# Patient Record
Sex: FEMALE | URBAN - METROPOLITAN AREA
[De-identification: names, ages, dates, MRNs, and addresses within clinical notes are randomized per-mention and may not be internally consistent; named-entity substitution may affect disease eponyms.]

---

## 2022-02-24 ENCOUNTER — APPOINTMENT (RX ONLY)
Dept: URBAN - METROPOLITAN AREA CLINIC 167 | Facility: CLINIC | Age: 67
Setting detail: DERMATOLOGY
End: 2022-02-24

## 2022-02-24 ENCOUNTER — APPOINTMENT (OUTPATIENT)
Age: 67
Setting detail: DERMATOLOGY
End: 2022-02-24

## 2022-02-24 ENCOUNTER — APPOINTMENT (RX ONLY)
Dept: URBAN - METROPOLITAN AREA CLINIC 75 | Facility: CLINIC | Age: 67
Setting detail: DERMATOLOGY
End: 2022-02-24

## 2022-02-24 DIAGNOSIS — L30.9 DERMATITIS, UNSPECIFIED: ICD-10-CM

## 2022-02-24 PROCEDURE — ? CHRONOLOGY OF PRESENT ILLNESS

## 2022-02-24 PROCEDURE — 99212 OFFICE O/P EST SF 10 MIN: CPT

## 2022-02-24 PROCEDURE — ? PRESCRIPTION

## 2022-02-24 PROCEDURE — ? COUNSELING

## 2022-02-24 RX ORDER — KETOCONAZOLE 20 MG/G
1 CREAM TOPICAL BID
Qty: 60 | Refills: 0 | Status: ERX | COMMUNITY
Start: 2022-02-24

## 2022-02-24 RX ORDER — KETOCONAZOLE 20 MG/G
1 CREAM TOPICAL BID
Qty: 60 | Refills: 0 | Status: ERX | COMMUNITY
Start: 2022-02-24 | End: 2022-02-26

## 2022-02-24 RX ADMIN — KETOCONAZOLE 1: 20 CREAM TOPICAL at 00:00

## 2022-02-24 ASSESSMENT — LOCATION DETAILED DESCRIPTION DERM
LOCATION DETAILED: RIGHT SUPERIOR ANTERIOR NECK

## 2022-02-24 ASSESSMENT — LOCATION SIMPLE DESCRIPTION DERM
LOCATION SIMPLE: RIGHT ANTERIOR NECK

## 2022-02-24 ASSESSMENT — LOCATION ZONE DERM
LOCATION ZONE: NECK

## 2022-02-24 NOTE — PROCEDURE: COUNSELING
Moisturizer Recommendations: Recommended diligent emollient use with gentle, unscented moisturizers.
Detail Level: Zone

## 2022-02-24 NOTE — PROCEDURE: MIPS QUALITY
Quality 226: Preventive Care And Screening: Tobacco Use: Screening And Cessation Intervention: Patient screened for tobacco use and is an ex/non-smoker
Detail Level: Detailed
Quality 431: Preventive Care And Screening: Unhealthy Alcohol Use - Screening: Patient not identified as an unhealthy alcohol user when screened for unhealthy alcohol use using a systematic screening method
Quality 110: Preventive Care And Screening: Influenza Immunization: Influenza Immunization not Administered because Patient Refused.
Quality 111:Pneumonia Vaccination Status For Older Adults: Pneumococcal Vaccination Previously Received

## 2022-03-09 ENCOUNTER — APPOINTMENT (RX ONLY)
Dept: URBAN - METROPOLITAN AREA CLINIC 167 | Facility: CLINIC | Age: 67
Setting detail: DERMATOLOGY
End: 2022-03-09

## 2022-03-09 ENCOUNTER — APPOINTMENT (RX ONLY)
Dept: URBAN - METROPOLITAN AREA CLINIC 75 | Facility: CLINIC | Age: 67
Setting detail: DERMATOLOGY
End: 2022-03-09

## 2022-03-09 ENCOUNTER — APPOINTMENT (OUTPATIENT)
Age: 67
Setting detail: DERMATOLOGY
End: 2022-03-09

## 2022-03-09 DIAGNOSIS — L30.9 DERMATITIS, UNSPECIFIED: ICD-10-CM

## 2022-03-09 DIAGNOSIS — L30.9 DERMATITIS, UNSPECIFIED: ICD-10-CM | Status: RESOLVING

## 2022-03-09 PROCEDURE — 99213 OFFICE O/P EST LOW 20 MIN: CPT

## 2022-03-09 PROCEDURE — ? CHRONOLOGY OF PRESENT ILLNESS

## 2022-03-09 PROCEDURE — ? COUNSELING

## 2022-03-09 ASSESSMENT — LOCATION ZONE DERM
LOCATION ZONE: NECK

## 2022-03-09 ASSESSMENT — LOCATION DETAILED DESCRIPTION DERM
LOCATION DETAILED: RIGHT SUPERIOR ANTERIOR NECK

## 2022-03-09 ASSESSMENT — LOCATION SIMPLE DESCRIPTION DERM
LOCATION SIMPLE: RIGHT ANTERIOR NECK

## 2022-03-09 NOTE — PROCEDURE: MIPS QUALITY
Quality 110: Preventive Care And Screening: Influenza Immunization: Influenza Immunization not Administered because Patient Refused.
Quality 111:Pneumonia Vaccination Status For Older Adults: Pneumococcal Vaccination Previously Received
Quality 431: Preventive Care And Screening: Unhealthy Alcohol Use - Screening: Patient not identified as an unhealthy alcohol user when screened for unhealthy alcohol use using a systematic screening method
Quality 226: Preventive Care And Screening: Tobacco Use: Screening And Cessation Intervention: Patient screened for tobacco use and is an ex/non-smoker
Detail Level: Detailed

## 2022-03-09 NOTE — PROCEDURE: CHRONOLOGY OF PRESENT ILLNESS
Detail Level: Zone
Chronology Of Present Illness: 2/24/22\\nInitial presentation to office for pruritic skin eruption to neck since December 2021 and she has been treating with Clobetasol twice daily for 20 days. we discussed possible tinea due to clinical presentation and no improvement with topical steroid. Discussed biopsy, but patient declined today. Will rx topical and revisit in 2 weeks. Will proceed with biopsy at that time\\n\\nPotential triggers: possibly tinea: new stray cats\\nPrevious treatment: Clobetasol \\n\\nNo new medications, vitamins, or supplements in the last 6 months. \\nNo fever, arthralgias, photosensitivity or other systemic symptoms. Otherwise healthy and well. \\nNo household contacts with similar symptoms. \\n\\n3/9/22\\nNeck clear upon examination. Advised patient to continue ketoconazole cream treatment 1 additional week then d/c. If rash reflares patient advised to contact office.

## 2022-03-09 NOTE — PROCEDURE: COUNSELING
Detail Level: Zone
Moisturizer Recommendations: Recommended diligent emollient use with gentle, unscented moisturizers.

## 2022-06-15 ENCOUNTER — APPOINTMENT (RX ONLY)
Dept: URBAN - METROPOLITAN AREA CLINIC 167 | Facility: CLINIC | Age: 67
Setting detail: DERMATOLOGY
End: 2022-06-15

## 2022-06-15 ENCOUNTER — APPOINTMENT (OUTPATIENT)
Age: 67
Setting detail: DERMATOLOGY
End: 2022-06-15

## 2022-06-15 ENCOUNTER — APPOINTMENT (RX ONLY)
Dept: URBAN - METROPOLITAN AREA CLINIC 75 | Facility: CLINIC | Age: 67
Setting detail: DERMATOLOGY
End: 2022-06-15

## 2022-06-15 DIAGNOSIS — L30.9 DERMATITIS, UNSPECIFIED: ICD-10-CM | Status: RESOLVED

## 2022-06-15 DIAGNOSIS — L30.9 DERMATITIS, UNSPECIFIED: ICD-10-CM

## 2022-06-15 PROCEDURE — 99213 OFFICE O/P EST LOW 20 MIN: CPT

## 2022-06-15 PROCEDURE — ? COUNSELING

## 2022-06-15 PROCEDURE — ? CHRONOLOGY OF PRESENT ILLNESS

## 2022-06-15 ASSESSMENT — LOCATION ZONE DERM
LOCATION ZONE: NECK

## 2022-06-15 ASSESSMENT — LOCATION SIMPLE DESCRIPTION DERM
LOCATION SIMPLE: RIGHT ANTERIOR NECK

## 2022-06-15 ASSESSMENT — LOCATION DETAILED DESCRIPTION DERM
LOCATION DETAILED: RIGHT SUPERIOR ANTERIOR NECK

## 2022-06-15 NOTE — PROCEDURE: MIPS QUALITY
Quality 226: Preventive Care And Screening: Tobacco Use: Screening And Cessation Intervention: Patient screened for tobacco use and is an ex/non-smoker
Quality 111:Pneumonia Vaccination Status For Older Adults: Pneumococcal vaccine administered on or after patientâs 60th birthday and before the end of the measurement period
Detail Level: Detailed
Quality 431: Preventive Care And Screening: Unhealthy Alcohol Use - Screening: Patient not identified as an unhealthy alcohol user when screened for unhealthy alcohol use using a systematic screening method

## 2022-06-15 NOTE — PROCEDURE: CHRONOLOGY OF PRESENT ILLNESS
Chronology Of Present Illness: 2/24/22\\nInitial presentation to office for pruritic skin eruption to neck since December 2021 and she has been treating with Clobetasol twice daily for 20 days. we discussed possible tinea due to clinical presentation and no improvement with topical steroid. Discussed biopsy, but patient declined today. Will rx topical and revisit in 2 weeks. Will proceed with biopsy at that time\\n\\nPotential triggers: possibly tinea: new stray cats\\nPrevious treatment: Clobetasol \\n\\nNo new medications, vitamins, or supplements in the last 6 months. \\nNo fever, arthralgias, photosensitivity or other systemic symptoms. Otherwise healthy and well. \\nNo household contacts with similar symptoms. \\n\\n3/9/22\\nNeck clear upon examination. Advised patient to continue ketoconazole cream treatment 1 additional week then d/c. If rash reflares patient advised to contact office. \\n\\n6/15/22 \\nPatient has d/c ketoconazole since last visit, neck clear on exam. Patient to contact office as needed if rash recurs.
Detail Level: Zone

## 2022-08-11 ENCOUNTER — RX ONLY (OUTPATIENT)
Age: 67
Setting detail: RX ONLY
End: 2022-08-11

## 2023-03-08 ENCOUNTER — APPOINTMENT (RX ONLY)
Dept: URBAN - METROPOLITAN AREA CLINIC 75 | Facility: CLINIC | Age: 68
Setting detail: DERMATOLOGY
End: 2023-03-08

## 2023-03-08 DIAGNOSIS — R21 RASH AND OTHER NONSPECIFIC SKIN ERUPTION: ICD-10-CM

## 2023-03-08 PROCEDURE — ? PRESCRIPTION MEDICATION MANAGEMENT

## 2023-03-08 PROCEDURE — ? CHRONOLOGY OF PRESENT ILLNESS

## 2023-03-08 PROCEDURE — ? COUNSELING

## 2023-03-08 PROCEDURE — ? PRESCRIPTION

## 2023-03-08 PROCEDURE — 99213 OFFICE O/P EST LOW 20 MIN: CPT

## 2023-03-08 RX ORDER — HYDROXYZINE HYDROCHLORIDE 10 MG/1
1-2 PO TABLET, FILM COATED ORAL QHS
Qty: 15 | Refills: 0 | Status: ERX | COMMUNITY
Start: 2023-03-08 | End: 2023-03-08

## 2023-03-08 RX ORDER — CLOBETASOL PROPIONATE 0.5 MG/G
THIN LAYER CREAM TOPICAL BID
Qty: 60 | Refills: 0 | Status: ERX | COMMUNITY
Start: 2023-03-08 | End: 2023-03-18

## 2023-03-08 RX ADMIN — CLOBETASOL PROPIONATE THIN LAYER: 0.5 CREAM TOPICAL at 00:00

## 2023-03-08 RX ADMIN — HYDROXYZINE HYDROCHLORIDE 1-2 PO: 10 TABLET, FILM COATED ORAL at 00:00

## 2023-03-08 ASSESSMENT — LOCATION SIMPLE DESCRIPTION DERM
LOCATION SIMPLE: RIGHT UPPER BACK
LOCATION SIMPLE: POSTERIOR NECK

## 2023-03-08 ASSESSMENT — LOCATION DETAILED DESCRIPTION DERM
LOCATION DETAILED: RIGHT SUPERIOR MEDIAL UPPER BACK
LOCATION DETAILED: MID POSTERIOR NECK

## 2023-03-08 ASSESSMENT — LOCATION ZONE DERM
LOCATION ZONE: NECK
LOCATION ZONE: TRUNK

## 2023-03-08 NOTE — PROCEDURE: MIPS QUALITY
Quality 47: Advance Care Plan: Advance Care Planning discussed and documented in the medical record; patient did not wish or was not able to name a surrogate decision maker or provide an advance care plan.
Quality 130: Documentation Of Current Medications In The Medical Record: Current Medications Documented
Detail Level: Detailed
Quality 226: Preventive Care And Screening: Tobacco Use: Screening And Cessation Intervention: Patient screened for tobacco use, is a smoker AND received Cessation Counseling within measurement period or in the six months prior to the measurement period
Quality 111:Pneumonia Vaccination Status For Older Adults: Pneumococcal vaccine (PPSV23) administered on or after patientâs 60th birthday and before the end of the measurement period

## 2023-03-08 NOTE — PROCEDURE: PRESCRIPTION MEDICATION MANAGEMENT
Initiate Treatment: Clobetasol cream BID x 2 weeks\\nHydroxyzine 1-2 pills QHS as needed
Plan: F/u in 2 weeks
Detail Level: Zone
Render In Strict Bullet Format?: No

## 2023-03-08 NOTE — PROCEDURE: CHRONOLOGY OF PRESENT ILLNESS
Detail Level: Zone
Chronology Of Present Illness: 3/8/23\\nPatient notes rash started around November last year but worsened after sleeping with her sick cat that had flea debris confirmed by her vet recently. Patient notes finding small white worms after washing her hair that she believed to be flea eggs. Excoriations noted on todays exam. Patient notes picking at lesions frequently. Back completely excoriated but middle of back spared where she cannot reach. Patient has been cleaning area with luis alfredo soap and baking soda, reports improvement with it. Advised to cut nails very short and stop scratching. Will start patient on clobetasol cream BID x 2 weeks and Hydroxyzine 1-2 tablets QHS as needed. Will send notes to PCP Dr Bela Palma as he believed this may be hookworms, but based off exam and per her vet, no hookworms. Photos taken on todays exam. Patient to f/u in 2 weeks to re-assess.

## 2023-03-22 ENCOUNTER — APPOINTMENT (RX ONLY)
Dept: URBAN - METROPOLITAN AREA CLINIC 75 | Facility: CLINIC | Age: 68
Setting detail: DERMATOLOGY
End: 2023-03-22

## 2023-03-22 DIAGNOSIS — R21 RASH AND OTHER NONSPECIFIC SKIN ERUPTION: ICD-10-CM

## 2023-03-22 PROCEDURE — ? PRESCRIPTION MEDICATION MANAGEMENT

## 2023-03-22 PROCEDURE — ? DEFER

## 2023-03-22 PROCEDURE — ? CHRONOLOGY OF PRESENT ILLNESS

## 2023-03-22 PROCEDURE — 99212 OFFICE O/P EST SF 10 MIN: CPT

## 2023-03-22 PROCEDURE — ? COUNSELING

## 2023-03-22 ASSESSMENT — SEVERITY ASSESSMENT: SEVERITY: MODERATE

## 2023-03-22 ASSESSMENT — LOCATION DETAILED DESCRIPTION DERM
LOCATION DETAILED: RIGHT SUPERIOR MEDIAL UPPER BACK
LOCATION DETAILED: MID POSTERIOR NECK

## 2023-03-22 ASSESSMENT — BSA RASH: BSA RASH: 18

## 2023-03-22 ASSESSMENT — LOCATION ZONE DERM
LOCATION ZONE: NECK
LOCATION ZONE: TRUNK

## 2023-03-22 ASSESSMENT — LOCATION SIMPLE DESCRIPTION DERM
LOCATION SIMPLE: RIGHT UPPER BACK
LOCATION SIMPLE: POSTERIOR NECK

## 2023-03-22 NOTE — PROCEDURE: CHRONOLOGY OF PRESENT ILLNESS
Detail Level: Zone
Chronology Of Present Illness: 3/8/23\\nPatient notes rash started around November last year but worsened after sleeping with her sick cat that had flea debris confirmed by her vet recently. Patient notes finding small white worms after washing her hair that she believed to be flea eggs. Excoriations noted on todays exam. Patient notes picking at lesions frequently. Back completely excoriated but middle of back spared where she cannot reach. Patient has been cleaning area with lusi alfredo soap and baking soda, reports improvement with it. Advised to cut nails very short and stop scratching. Will start patient on clobetasol cream BID x 2 weeks and Hydroxyzine 1-2 tablets QHS as needed. Will send notes to PCP Dr Neda Corrigan as he believed this may be hookworms, but based off exam and per her vet, no hookworms. Photos taken on todays exam. Patient to f/u in 2 weeks to re-assess. \\n\\n3/22/23\\nPatient  reports slow improvement with topical clobetasol. She believes this issue could be from mites. Patient has tried using new tea tree oil to help soothe the lesions. Would like to move forward with biopsy but due to insurance she needs PCP referral in order to have approval for biopsy. Will biopsy two sites at next visit. Patient to F/U once approval is received.

## 2023-03-22 NOTE — PROCEDURE: DEFER
Size Of Lesion In Cm (Optional): 0
Procedure To Be Performed At Next Visit: Biopsy by punch method
Detail Level: Detailed
Introduction Text (Please End With A Colon): The following procedure was deferred: Shave biopsy
Instructions (Optional): Patients insurance requires PCP referral,will biopsy 2 lesions once referral is sent.

## 2023-04-06 ENCOUNTER — APPOINTMENT (RX ONLY)
Dept: URBAN - METROPOLITAN AREA CLINIC 75 | Facility: CLINIC | Age: 68
Setting detail: DERMATOLOGY
End: 2023-04-06

## 2023-04-06 DIAGNOSIS — D49.2 NEOPLASM OF UNSPECIFIED BEHAVIOR OF BONE, SOFT TISSUE, AND SKIN: ICD-10-CM

## 2023-04-06 PROCEDURE — ? BIOPSY BY PUNCH METHOD

## 2023-04-06 PROCEDURE — 11104 PUNCH BX SKIN SINGLE LESION: CPT

## 2023-04-06 PROCEDURE — ? BIOPSY BY SHAVE METHOD

## 2023-04-06 PROCEDURE — 11103 TANGNTL BX SKIN EA SEP/ADDL: CPT

## 2023-04-06 ASSESSMENT — LOCATION SIMPLE DESCRIPTION DERM
LOCATION SIMPLE: RIGHT FOREARM
LOCATION SIMPLE: ABDOMEN

## 2023-04-06 ASSESSMENT — LOCATION ZONE DERM
LOCATION ZONE: ARM
LOCATION ZONE: TRUNK

## 2023-04-06 ASSESSMENT — LOCATION DETAILED DESCRIPTION DERM
LOCATION DETAILED: EPIGASTRIC SKIN
LOCATION DETAILED: RIGHT DISTAL DORSAL FOREARM

## 2023-04-06 NOTE — PROCEDURE: BIOPSY BY PUNCH METHOD

## 2023-04-06 NOTE — PROCEDURE: BIOPSY BY SHAVE METHOD

## 2023-04-20 ENCOUNTER — APPOINTMENT (RX ONLY)
Dept: URBAN - METROPOLITAN AREA CLINIC 75 | Facility: CLINIC | Age: 68
Setting detail: DERMATOLOGY
End: 2023-04-20

## 2023-04-20 DIAGNOSIS — Z48.02 ENCOUNTER FOR REMOVAL OF SUTURES: ICD-10-CM

## 2023-04-20 DIAGNOSIS — F42.4 EXCORIATION (SKIN-PICKING) DISORDER: ICD-10-CM | Status: IMPROVED

## 2023-04-20 PROCEDURE — 99212 OFFICE O/P EST SF 10 MIN: CPT

## 2023-04-20 PROCEDURE — ? PRESCRIPTION MEDICATION MANAGEMENT

## 2023-04-20 PROCEDURE — ? COUNSELING

## 2023-04-20 PROCEDURE — ? CHRONOLOGY OF PRESENT ILLNESS

## 2023-04-20 PROCEDURE — ? SUTURE REMOVAL (NO GLOBAL PERIOD)

## 2023-04-20 ASSESSMENT — LOCATION ZONE DERM
LOCATION ZONE: TRUNK
LOCATION ZONE: NECK

## 2023-04-20 ASSESSMENT — LOCATION SIMPLE DESCRIPTION DERM
LOCATION SIMPLE: RIGHT UPPER BACK
LOCATION SIMPLE: ABDOMEN
LOCATION SIMPLE: POSTERIOR NECK

## 2023-04-20 ASSESSMENT — LOCATION DETAILED DESCRIPTION DERM
LOCATION DETAILED: RIGHT SUPERIOR MEDIAL UPPER BACK
LOCATION DETAILED: EPIGASTRIC SKIN
LOCATION DETAILED: MID POSTERIOR NECK

## 2023-04-20 NOTE — PROCEDURE: PRESCRIPTION MEDICATION MANAGEMENT
Continue Regimen: Clobetasol cream BID x 2 weeks\\nHydroxyzine 1-2 pills QHS as needed
Detail Level: Zone
Render In Strict Bullet Format?: No

## 2023-04-20 NOTE — PROCEDURE: CHRONOLOGY OF PRESENT ILLNESS
Detail Level: Zone
Chronology Of Present Illness: 3/8/23\\nPatient notes rash started around November last year but worsened after sleeping with her sick cat that had flea debris confirmed by her vet recently. Patient notes finding small white worms after washing her hair that she believed to be flea eggs. Excoriations noted on todays exam. Patient notes picking at lesions frequently. Back completely excoriated but middle of back spared where she cannot reach. Patient has been cleaning area with luis alfredo soap and baking soda, reports improvement with it. Advised to cut nails very short and stop scratching. Will start patient on clobetasol cream BID x 2 weeks and Hydroxyzine 1-2 tablets QHS as needed. Will send notes to PCP Dr Charlotte Gill as he believed this may be hookworms, but based off exam and per her vet, no hookworms. Photos taken on todays exam. Patient to f/u in 2 weeks to re-assess. \\n\\n3/22/23\\nPatient  reports slow improvement with topical clobetasol. She believes this issue could be from mites. Patient has tried using new tea tree oil to help soothe the lesions. Would like to move forward with biopsy but due to insurance she needs PCP referral in order to have approval for biopsy. Will biopsy two sites at next visit. Patient to F/U once approval is received. \\n\\n3/22/23 BIOPSY RESULTS: EXCORIATIONS\\n\\n4/20/23\\nImprovement on exam, but she denies using anything we recommended. She states at this time she is using hydrogen peroxide, menthol cream, peppermint hair wash and \"at home remedies\" to help with her symptoms. Patient notes using permethrin as spot treatment from another provider. She states she has picked worms off her daily and brought one in for me to see, but under dermatoscope, was a hair. Discussed with patient. Patient to have further work up with PCP, notes doing stool sample next week. Patient believes condition is mite related. Advised to continue current regimen if it helps symptoms. Patient to f/u PRN.